# Patient Record
Sex: FEMALE | Race: WHITE | NOT HISPANIC OR LATINO | Employment: UNEMPLOYED | URBAN - NONMETROPOLITAN AREA
[De-identification: names, ages, dates, MRNs, and addresses within clinical notes are randomized per-mention and may not be internally consistent; named-entity substitution may affect disease eponyms.]

---

## 2017-01-17 ENCOUNTER — OFFICE VISIT (OUTPATIENT)
Dept: SURGERY | Facility: CLINIC | Age: 52
End: 2017-01-17

## 2017-01-17 VITALS
DIASTOLIC BLOOD PRESSURE: 90 MMHG | HEART RATE: 88 BPM | SYSTOLIC BLOOD PRESSURE: 118 MMHG | BODY MASS INDEX: 26.58 KG/M2 | WEIGHT: 150 LBS | HEIGHT: 63 IN

## 2017-01-17 DIAGNOSIS — K43.2 INCISIONAL HERNIA, WITHOUT OBSTRUCTION OR GANGRENE: Primary | ICD-10-CM

## 2017-01-17 PROCEDURE — 99212 OFFICE O/P EST SF 10 MIN: CPT | Performed by: SURGERY

## 2017-01-17 NOTE — MR AVS SNAPSHOT
Stephaniemisti Day   1/17/2017 3:10 PM   Office Visit    Dept Phone:  [phone]   Encounter #:  [id number]    Provider:  Matthew Cardoza MD   Department:  [facility] GENERAL SURGERY                Your Full Care Plan              Your Updated Medication List          This list is accurate as of: 1/17/17  2:40 PM.  Always use your most recent med list.                albuterol 108 (90 BASE) MCG/ACT inhaler   Commonly known as:  PROVENTIL HFA;VENTOLIN HFA       aspirin 81 MG EC tablet       B-12 COMPLIANCE INJECTION 1000 MCG/ML kit   Generic drug:  Cyanocobalamin       baclofen 10 MG tablet   Commonly known as:  LIORESAL       busPIRone 5 MG tablet   Commonly known as:  BUSPAR       * docusate sodium 250 MG capsule   Commonly known as:  COLACE   Take 1 capsule by mouth Daily.       * STOOL SOFTENER 250 MG capsule   Generic drug:  docusate sodium   Take 1 capsule by mouth Daily.       fluticasone 50 MCG/ACT nasal spray   Commonly known as:  FLONASE       gabapentin 300 MG capsule   Commonly known as:  NEURONTIN       ibuprofen 800 MG tablet   Commonly known as:  ADVIL,MOTRIN       ipratropium-albuterol 0.5-2.5 mg/mL nebulizer   Commonly known as:  DUO-NEB       losartan 25 MG tablet   Commonly known as:  COZAAR       metFORMIN 850 MG tablet   Commonly known as:  GLUCOPHAGE       metoprolol tartrate 25 MG tablet   Commonly known as:  LOPRESSOR       miconazole 2 % vaginal cream   Commonly known as:  MICOTIN       mometasone-formoterol 200-5 MCG/ACT inhaler   Commonly known as:  DULERA 200       montelukast 10 MG tablet   Commonly known as:  SINGULAIR       nystatin 183549 UNIT/GM ointment   Commonly known as:  MYCOSTATIN       omeprazole 40 MG capsule   Commonly known as:  priLOSEC       phenylephrine-shark liver oil-mineral oil-petrolatum 0.25-3-14-71.9 % rectal ointment   Commonly known as:  PREPARATION H       simvastatin 20 MG tablet   Commonly known as:  ZOCOR       "VICTOZA 18 MG/3ML solution pen-injector   Generic drug:  Liraglutide       * Notice:  This list has 2 medication(s) that are the same as other medications prescribed for you. Read the directions carefully, and ask your doctor or other care provider to review them with you.            Instructions     None    Patient Instructions History      Upcoming Appointments     Visit Type Date Time Department    FOLLOW UP 1/17/2017  3:10 PM [facility] [place]    FOLLOW UP 4/18/2017  2:20 PM [facility] [place]      MyChart Signup     Our records indicate that you have declined [facility] Proximetryhart signup. If you would like to sign up for Proximetryhart, please email [email or web address] or call [phone] to obtain an activation code.             Other Info from Your Visit           Your Appointments     Jan 17, 2017  3:10 PM EST   Follow Up with Matthew Cardoza MD   [facility] GENERAL SURGERY (--)    [address] [place] [id number] [phone]           Arrive 15 minutes prior to appointment.            Apr 18, 2017  2:20 PM EDT   Follow Up with Matthew Cardoza MD   [facility] GENERAL SURGERY (--)    [address] [place] [id number] [phone]           Arrive 15 minutes prior to appointment.              Allergies     Morphine And Related Allergy Hives, Shortness Of Breath    Latex      Nickel      Zithromax [Azithromycin] Allergy Palpitations      Reason for Visit     ABDOMINAL PAIN           Vital Signs     Blood Pressure Pulse Height Weight Body Mass Index Smoking Status    118/90 88 63\" (160 cm) 150 lb (68 kg) 26.57 kg/m2 Former Smoker        "

## 2017-01-17 NOTE — LETTER
January 18, 2017     Ashly Muir, APRN  [address] [place] [address]    Patient: Stephanie Day   YOB: 1965   Date of Visit: 1/17/2017       Dear Dr. Muir, APRN:    Thank you for referring Stephanie Day to me for evaluation. Below are the relevant portions of my assessment and plan of care.           51-year-old female with postoperative pain likely related to transfacial sutures causing some nerve disruption.  The patient is been assured that is the sutures absorbable and inflammation decreases her pain will likely improve.  She will follow-up in 1 month.               If you have questions, please do not hesitate to call me. I look forward to following Stephanie along with you.         Sincerely,        Matthew Cardoza MD        CC: No Recipients

## 2017-01-18 NOTE — PROGRESS NOTES
Subjective   Stephanie Day is a 51 y.o. female  is here today for follow-up.         Stephanie Day is a 51 y.o. female here for follow-up with concerns of burning pain along the rectus musculature bilaterally greater on the right.  She is undergone open hernia repair with separation of components and retrorectus fashion.  On examination there is no evidence of hernia recurrence or infection.  The patient has no tenderness to palpation but states with certain movements she gets burning pain that shoots down the lateral aspects of her abdominal wall.            Stephanie was seen today for abdominal pain.    Diagnoses and all orders for this visit:    Incisional hernia, without obstruction or gangrene        Assessment     51-year-old female with postoperative pain likely related to transfacial sutures causing some nerve disruption.  The patient is been assured that is the sutures absorbable and inflammation decreases her pain will likely improve.  She will follow-up in 1 month.

## 2017-06-13 ENCOUNTER — OFFICE VISIT (OUTPATIENT)
Dept: SURGERY | Facility: CLINIC | Age: 52
End: 2017-06-13

## 2017-06-13 VITALS — WEIGHT: 153.6 LBS | BODY MASS INDEX: 27.21 KG/M2 | HEIGHT: 63 IN

## 2017-06-13 DIAGNOSIS — K43.2 INCISIONAL HERNIA, WITHOUT OBSTRUCTION OR GANGRENE: Primary | ICD-10-CM

## 2017-06-13 PROCEDURE — 99213 OFFICE O/P EST LOW 20 MIN: CPT | Performed by: SURGERY

## 2017-06-15 NOTE — PROGRESS NOTES
Subjective   Stephanie Day is a 52 y.o. female  is here today for follow-up.         Stephanie Day is a 52 y.o. female here for follow up after open retrorectus incisional hernia repair with mesh.  The patient has continued muscle spasms which may be related to the transfacial sutures but no evidence of hernia recurrence is visible on examination today.  Her incision is clean dry and intact.  No evidence of inflammation or infection.  She has intermittent muscle spasms that are possibly related to deconditioning.      Stephanie was seen today for follow-up.    Diagnoses and all orders for this visit:    Incisional hernia, without obstruction or gangrene        Assessment     Stephanie Day is a 52 y.o. female status post open ventral incisional hernia repair with persistent muscle spasms.  This may be due to deconditioning as there is no evidence of hernia recurrence or mesh complication.  The patient will begin abdominal exercises and follow-up as needed.

## 2018-01-30 ENCOUNTER — OFFICE VISIT (OUTPATIENT)
Dept: SURGERY | Facility: CLINIC | Age: 53
End: 2018-01-30

## 2018-01-30 VITALS — HEIGHT: 63 IN | WEIGHT: 166 LBS | BODY MASS INDEX: 29.41 KG/M2

## 2018-01-30 DIAGNOSIS — K43.2 INCISIONAL HERNIA, WITHOUT OBSTRUCTION OR GANGRENE: Primary | ICD-10-CM

## 2018-01-30 PROCEDURE — 99213 OFFICE O/P EST LOW 20 MIN: CPT | Performed by: SURGERY

## 2018-02-01 NOTE — PROGRESS NOTES
Subjective   Stephanie Day is a 52 y.o. female  is here today for follow-up.         Stephanie Day is a 52 y.o. female here for follow up after incisional hernia repair with mesh.  The patient is continuing to do well but does have complaints of muscle spasm along the right breast as musculature.  No evidence of hernia recurrence infection or mesh complication.  She is tolerating regular diet and having normal bowel function.  Her midline incisional scars well-healed.    Stephanie was seen today for follow-up.    Diagnoses and all orders for this visit:    Incisional hernia, without obstruction or gangrene        Assessment     Stephanie Day is a 52 y.o. female status post open incisional hernia repair with mesh.  The patient is doing well without evidence of mesh complication, recurrence, or infection.  She will follow-up as needed.

## 2021-05-05 ENCOUNTER — TRANSCRIBE ORDERS (OUTPATIENT)
Dept: ADMINISTRATIVE | Facility: HOSPITAL | Age: 56
End: 2021-05-05

## 2021-05-05 DIAGNOSIS — Z01.818 OTHER SPECIFIED PRE-OPERATIVE EXAMINATION: Primary | ICD-10-CM

## 2021-05-10 ENCOUNTER — HOSPITAL ENCOUNTER (EMERGENCY)
Dept: HOSPITAL 79 - ER1 | Age: 56
Discharge: HOME | End: 2021-05-10
Payer: COMMERCIAL

## 2021-05-10 DIAGNOSIS — Z88.8: ICD-10-CM

## 2021-05-10 DIAGNOSIS — I10: ICD-10-CM

## 2021-05-10 DIAGNOSIS — R42: ICD-10-CM

## 2021-05-10 DIAGNOSIS — R07.89: Primary | ICD-10-CM

## 2021-05-10 DIAGNOSIS — Z20.822: ICD-10-CM

## 2021-05-10 DIAGNOSIS — E11.9: ICD-10-CM

## 2021-05-10 DIAGNOSIS — J44.9: ICD-10-CM

## 2021-05-10 LAB
BUN/CREATININE RATIO: 19 (ref 0–10)
HGB BLD-MCNC: 11.4 GM/DL (ref 12.3–15.3)
RED BLOOD COUNT: 3.9 M/UL (ref 4–5.1)
WHITE BLOOD COUNT: 8.6 K/UL (ref 4.5–11)

## 2021-05-19 ENCOUNTER — HOSPITAL ENCOUNTER (OUTPATIENT)
Dept: HOSPITAL 79 - EXRD | Age: 56
End: 2021-05-19
Attending: INTERNAL MEDICINE
Payer: COMMERCIAL

## 2021-05-19 DIAGNOSIS — R60.0: Primary | ICD-10-CM

## 2021-06-01 ENCOUNTER — HOSPITAL ENCOUNTER (OUTPATIENT)
Dept: HOSPITAL 79 - ECHO | Age: 56
End: 2021-06-01
Attending: FAMILY MEDICINE
Payer: COMMERCIAL

## 2021-06-01 DIAGNOSIS — I07.1: ICD-10-CM

## 2021-06-01 DIAGNOSIS — R60.9: Primary | ICD-10-CM

## 2021-11-30 ENCOUNTER — HOSPITAL ENCOUNTER (OUTPATIENT)
Dept: HOSPITAL 79 - MAMO | Age: 56
End: 2021-11-30
Payer: COMMERCIAL

## 2021-11-30 DIAGNOSIS — Z12.31: Primary | ICD-10-CM

## 2022-05-08 ENCOUNTER — HOSPITAL ENCOUNTER (EMERGENCY)
Dept: HOSPITAL 79 - ER1 | Age: 57
Discharge: HOME | End: 2022-05-08
Payer: COMMERCIAL

## 2022-05-08 DIAGNOSIS — J44.9: ICD-10-CM

## 2022-05-08 DIAGNOSIS — E78.5: ICD-10-CM

## 2022-05-08 DIAGNOSIS — E11.9: ICD-10-CM

## 2022-05-08 DIAGNOSIS — R10.84: ICD-10-CM

## 2022-05-08 DIAGNOSIS — I10: ICD-10-CM

## 2022-05-08 DIAGNOSIS — K21.9: ICD-10-CM

## 2022-05-08 DIAGNOSIS — R07.2: Primary | ICD-10-CM

## 2022-05-08 LAB
BUN/CREATININE RATIO: 18 (ref 0–10)
HGB BLD-MCNC: 12.1 GM/DL (ref 12.3–15.3)
RED BLOOD COUNT: 4.19 M/UL (ref 4–5.1)
WHITE BLOOD COUNT: 9 K/UL (ref 4.5–11)

## 2022-05-08 PROCEDURE — C9113 INJ PANTOPRAZOLE SODIUM, VIA: HCPCS

## 2022-05-27 DIAGNOSIS — M25.561 RIGHT KNEE PAIN, UNSPECIFIED CHRONICITY: Primary | ICD-10-CM

## 2022-05-31 ENCOUNTER — OFFICE VISIT (OUTPATIENT)
Dept: ORTHOPEDIC SURGERY | Facility: CLINIC | Age: 57
End: 2022-05-31

## 2022-05-31 ENCOUNTER — HOSPITAL ENCOUNTER (OUTPATIENT)
Dept: GENERAL RADIOLOGY | Facility: HOSPITAL | Age: 57
Discharge: HOME | End: 2022-05-31
Admitting: FAMILY MEDICINE

## 2022-05-31 VITALS
HEART RATE: 91 BPM | SYSTOLIC BLOOD PRESSURE: 124 MMHG | HEIGHT: 63 IN | OXYGEN SATURATION: 97 % | WEIGHT: 220 LBS | TEMPERATURE: 97.1 F | BODY MASS INDEX: 38.98 KG/M2 | DIASTOLIC BLOOD PRESSURE: 76 MMHG

## 2022-05-31 DIAGNOSIS — G89.29 CHRONIC HIP PAIN, RIGHT: ICD-10-CM

## 2022-05-31 DIAGNOSIS — M25.561 RIGHT KNEE PAIN, UNSPECIFIED CHRONICITY: ICD-10-CM

## 2022-05-31 DIAGNOSIS — G89.29 CHRONIC PAIN OF RIGHT KNEE: ICD-10-CM

## 2022-05-31 DIAGNOSIS — Z79.4 TYPE 2 DIABETES MELLITUS WITH DIABETIC NEUROPATHY, WITH LONG-TERM CURRENT USE OF INSULIN: Primary | ICD-10-CM

## 2022-05-31 DIAGNOSIS — M25.651 HIP STIFFNESS, RIGHT: ICD-10-CM

## 2022-05-31 DIAGNOSIS — M17.11 PRIMARY OSTEOARTHRITIS OF RIGHT KNEE: ICD-10-CM

## 2022-05-31 DIAGNOSIS — M25.551 CHRONIC HIP PAIN, RIGHT: ICD-10-CM

## 2022-05-31 DIAGNOSIS — E11.40 TYPE 2 DIABETES MELLITUS WITH DIABETIC NEUROPATHY, WITH LONG-TERM CURRENT USE OF INSULIN: Primary | ICD-10-CM

## 2022-05-31 DIAGNOSIS — M25.561 CHRONIC PAIN OF RIGHT KNEE: ICD-10-CM

## 2022-05-31 PROCEDURE — 99204 OFFICE O/P NEW MOD 45 MIN: CPT | Performed by: FAMILY MEDICINE

## 2022-05-31 PROCEDURE — 73562 X-RAY EXAM OF KNEE 3: CPT | Performed by: RADIOLOGY

## 2022-05-31 PROCEDURE — 73562 X-RAY EXAM OF KNEE 3: CPT

## 2022-05-31 RX ORDER — POTASSIUM CHLORIDE 600 MG/1
TABLET, FILM COATED, EXTENDED RELEASE ORAL
COMMUNITY
Start: 2022-03-08

## 2022-05-31 RX ORDER — LANOLIN ALCOHOL/MO/W.PET/CERES
65 CREAM (GRAM) TOPICAL
COMMUNITY

## 2022-05-31 RX ORDER — FENOFIBRATE 145 MG/1
TABLET, COATED ORAL
COMMUNITY
Start: 2022-03-29

## 2022-05-31 RX ORDER — FUROSEMIDE 20 MG/1
20 TABLET ORAL 2 TIMES DAILY
COMMUNITY

## 2022-05-31 RX ORDER — FERROUS SULFATE 325(65) MG
TABLET ORAL
COMMUNITY
Start: 2022-05-05

## 2022-06-08 ENCOUNTER — OFFICE VISIT (OUTPATIENT)
Dept: ORTHOPEDIC SURGERY | Facility: CLINIC | Age: 57
End: 2022-06-08

## 2022-06-08 ENCOUNTER — HOSPITAL ENCOUNTER (OUTPATIENT)
Dept: GENERAL RADIOLOGY | Facility: HOSPITAL | Age: 57
Discharge: HOME | End: 2022-06-08
Admitting: FAMILY MEDICINE

## 2022-06-08 VITALS
SYSTOLIC BLOOD PRESSURE: 111 MMHG | DIASTOLIC BLOOD PRESSURE: 70 MMHG | WEIGHT: 184 LBS | HEIGHT: 63 IN | HEART RATE: 92 BPM | TEMPERATURE: 97.5 F | BODY MASS INDEX: 32.6 KG/M2

## 2022-06-08 DIAGNOSIS — M25.551 CHRONIC HIP PAIN, RIGHT: ICD-10-CM

## 2022-06-08 DIAGNOSIS — M17.11 PRIMARY OSTEOARTHRITIS OF RIGHT KNEE: Primary | ICD-10-CM

## 2022-06-08 DIAGNOSIS — M25.551 RIGHT HIP PAIN: Primary | ICD-10-CM

## 2022-06-08 DIAGNOSIS — G89.29 CHRONIC PAIN OF RIGHT KNEE: ICD-10-CM

## 2022-06-08 DIAGNOSIS — M25.651 HIP STIFFNESS, RIGHT: ICD-10-CM

## 2022-06-08 DIAGNOSIS — M25.561 CHRONIC PAIN OF RIGHT KNEE: ICD-10-CM

## 2022-06-08 DIAGNOSIS — M16.0 PRIMARY OSTEOARTHRITIS OF BOTH HIPS: ICD-10-CM

## 2022-06-08 DIAGNOSIS — G89.29 CHRONIC HIP PAIN, RIGHT: ICD-10-CM

## 2022-06-08 PROCEDURE — 20610 DRAIN/INJ JOINT/BURSA W/O US: CPT | Performed by: FAMILY MEDICINE

## 2022-06-08 PROCEDURE — 99214 OFFICE O/P EST MOD 30 MIN: CPT | Performed by: FAMILY MEDICINE

## 2022-06-08 PROCEDURE — 73502 X-RAY EXAM HIP UNI 2-3 VIEWS: CPT

## 2022-06-08 PROCEDURE — 73502 X-RAY EXAM HIP UNI 2-3 VIEWS: CPT | Performed by: RADIOLOGY

## 2022-06-08 RX ORDER — DICLOFENAC SODIUM 30 MG/G
GEL TOPICAL 2 TIMES DAILY
COMMUNITY

## 2022-06-08 RX ORDER — NABUMETONE 500 MG/1
500 TABLET, FILM COATED ORAL EVERY 12 HOURS PRN
Qty: 60 TABLET | Refills: 1 | Status: SHIPPED | OUTPATIENT
Start: 2022-06-08

## 2022-06-08 RX ADMIN — LIDOCAINE HYDROCHLORIDE 5 ML: 20 INJECTION, SOLUTION INFILTRATION; PERINEURAL at 14:26

## 2022-06-08 NOTE — PROGRESS NOTES
"Follow Up Visit      Patient: Stephanie Day  YOB: 1965  Date of Encounter: 06/08/2022  PCP: Ashly Muir APRN  Referring Provider: No ref. provider found     Subjective   Stephanie Day is a 57 y.o. female who presents to the office today for evaluation of Follow-up of the Right Knee and Pain of the Right Hip (Stiffness, New Problem)      Chief Complaint   Patient presents with   • Right Knee - Follow-up   • Right Hip - Pain     Stiffness, New Problem       HPI  Patient presents for follow-up for right knee injection with Zilretta.  Patient also is getting further evaluation for her right hip today.  Continued complaints of continued pain and stiffness in the right hip with difficulty ambulating.  Seems to be getting worse.  Patient Active Problem List   Diagnosis   • Incisional hernia, without obstruction or gangrene   • Coronary artery disease   • COPD (chronic obstructive pulmonary disease) ([facility])   • Diabetes ([facility])       Past Medical History:   Diagnosis Date   • Anemia    • Anxiety    • Arthritis    • COPD (chronic obstructive pulmonary disease) ([facility])    • Coronary artery disease    • Depressed    • Depression    • Diabetes ([facility])    • Hypertension    • MI (myocardial infarction) ([facility]) 2014   • Panic attack        Allergies   Allergen Reactions   • Morphine And Related Hives and Shortness Of Breath   • Latex Hives   • Nickel Other (See Comments)     Breaks out   • Zithromax [Azithromycin] Palpitations       Review of Systems   Constitutional: Positive for activity change. Negative for fever.   Respiratory: Negative for shortness of breath and wheezing.    Cardiovascular: Negative for chest pain.   Musculoskeletal: Positive for arthralgias and myalgias.   Skin: Negative for color change and wound.   Neurological: Negative for weakness and numbness.       Visit Vitals  /70   Pulse 92   Temp 97.5 °F (36.4 °C)   Ht 160 cm (63\")   Wt 83.5 kg (184 lb)   BMI 32.59 kg/m²     57 " y.o.female  Physical Exam  Vitals and nursing note reviewed.   Constitutional:       General: She is not in acute distress.     Appearance: Normal appearance.   Pulmonary:      Effort: Pulmonary effort is normal. No respiratory distress.   Skin:     General: Skin is warm and dry.      Findings: No erythema.   Neurological:      General: No focal deficit present.      Mental Status: She is alert.      Sensory: No sensory deficit.      Motor: No weakness.     Right knee exam:  Sensation is intact.   Difficulty fully extending the right knee due to hip pain.   No effusion.   Tender medial and lateral joint line.   Patient can fully extend the knee if the entire leg is raised, but hip cannot fully straighten out.   Negative varus, valgus stress.   Negative AP drawer.      Right hip exam:  Very limited range of motion with pain in all directions. Cannot extend the hip enough to lay it flat on the table. Intact strength.  Tender greater trochanter and anterior joint     Left knee exam:  Full range of motion.   Negative varus, valgus stress.   Negative AP drawer.   Negative Lachman.      Right lower extremity exam:  Neurovascularly intact.   Painful at the ankle.     Left lower extremity exam:  Neurovascularly intact.     Radiology Results:    XR Knee 3 View Right    Result Date: 5/31/2022  Mild osteoarthritis in the right knee.  This report was finalized on 5/31/2022 3:18 PM by Dr. Raj Carlton II, MD.      XR Hip With or Without Pelvis 2 - 3 View Right    Result Date: 6/8/2022    Very advanced right and advanced left hip osteoarthritic change.  This report was finalized on 6/8/2022 1:40 PM by Dr. Mars Wilson MD.    Large Joint Arthrocentesis: R knee  Date/Time: 6/8/2022 2:26 PM  Consent given by: patient  Site marked: site marked  Timeout: Immediately prior to procedure a time out was called to verify the correct patient, procedure, equipment, support staff and site/side marked as required   Supporting  Documentation  Indications: pain and joint swelling   Procedure Details  Location: knee - R knee  Needle size: 20 G  Approach: anterolateral  Medications administered: 32 mg Triamcinolone Acetonide 32 MG; 5 mL lidocaine 2%  Patient tolerance: patient tolerated the procedure well with no immediate complications       Injection site on the lateral knee was cleaned with alcohol and iodine and anesthesia was provided with ethyl chloride spray.  Then 5 cc of 2% lidocaine without epinephrine was injected into the subcutaneous tissues using a 1.5 inch 25-gauge needle for anesthesia.  Then using sterile technique and a 20-gauge 1.5 inch needle the knee joint was accessed 32mg of zilretta was injected.  The injection site was covered with sterile bandage.  There is no adverse effects and negligible  blood loss.  Follow-up care and precautions were discussed.      Assessment & Plan   Diagnoses and all orders for this visit:    1. Primary osteoarthritis of right knee (Primary)  -     nabumetone (RELAFEN) 500 MG tablet; Take 1 tablet by mouth Every 12 (Twelve) Hours As Needed for Moderate Pain .  Dispense: 60 tablet; Refill: 1  -     Large Joint Arthrocentesis: R knee  -     FL Guide For Pain Meds Inj; Future    2. Chronic pain of right knee  -     nabumetone (RELAFEN) 500 MG tablet; Take 1 tablet by mouth Every 12 (Twelve) Hours As Needed for Moderate Pain .  Dispense: 60 tablet; Refill: 1  -     Large Joint Arthrocentesis: R knee  -     FL Guide For Pain Meds Inj; Future    3. Chronic hip pain, right  -     nabumetone (RELAFEN) 500 MG tablet; Take 1 tablet by mouth Every 12 (Twelve) Hours As Needed for Moderate Pain .  Dispense: 60 tablet; Refill: 1  -     FL Guide For Pain Meds Inj; Future    4. Hip stiffness, right  -     nabumetone (RELAFEN) 500 MG tablet; Take 1 tablet by mouth Every 12 (Twelve) Hours As Needed for Moderate Pain .  Dispense: 60 tablet; Refill: 1  -     FL Guide For Pain Meds Inj; Future    5. Primary  osteoarthritis of both hips  -     nabumetone (RELAFEN) 500 MG tablet; Take 1 tablet by mouth Every 12 (Twelve) Hours As Needed for Moderate Pain .  Dispense: 60 tablet; Refill: 1  -     FL Guide For Pain Meds Inj; Future         MEDS ORDERED DURING VISIT:  New Medications Ordered This Visit   Medications   • nabumetone (RELAFEN) 500 MG tablet     Sig: Take 1 tablet by mouth Every 12 (Twelve) Hours As Needed for Moderate Pain .     Dispense:  60 tablet     Refill:  1     MEDICATION ISSUES:  Discussed medication options and treatment plan of prescribed medication as well as the risks, benefits, and side effects including potential falls, possible impaired driving and metabolic adversities among others. Patient is agreeable to call the office with any worsening of symptoms or onset of side effects. Patient is agreeable to call 911 or go to the nearest ER should he/she begin having SI/HI.     Discussion:  Patient had improved pain after Zilretta injection.  We will try nabumetone to see if that works better than the other NSAIDs she has tried in the past.  Patient was also being referred for steroid injection in the right hip under fluoroscopy guidance.  Follow-up after that injection             This document has been electronically signed by David Mina DO   June 8, 2022 14:28 EDT    Part of this note may be an electronic transcription/translation of spoken language to printed text using the Dragon Dictation System.

## 2022-06-24 ENCOUNTER — TELEPHONE (OUTPATIENT)
Dept: ORTHOPEDIC SURGERY | Facility: CLINIC | Age: 57
End: 2022-06-24

## 2022-06-24 NOTE — TELEPHONE ENCOUNTER
Patient called asking about her inj in her hip, saying that she never got a call. I called scheduling to get it set up, she said she will call me back with an appt. Will notify patient afterwards.

## 2022-06-28 RX ORDER — LIDOCAINE HYDROCHLORIDE 20 MG/ML
5 INJECTION, SOLUTION INFILTRATION; PERINEURAL
Status: COMPLETED | OUTPATIENT
Start: 2022-06-08 | End: 2022-06-08

## 2022-07-06 ENCOUNTER — OFFICE VISIT (OUTPATIENT)
Dept: ORTHOPEDIC SURGERY | Facility: CLINIC | Age: 57
End: 2022-07-06

## 2022-07-06 ENCOUNTER — TELEPHONE (OUTPATIENT)
Dept: ORTHOPEDIC SURGERY | Facility: CLINIC | Age: 57
End: 2022-07-06

## 2022-07-06 VITALS
HEIGHT: 63 IN | SYSTOLIC BLOOD PRESSURE: 120 MMHG | HEART RATE: 92 BPM | BODY MASS INDEX: 32.6 KG/M2 | DIASTOLIC BLOOD PRESSURE: 71 MMHG | WEIGHT: 184 LBS

## 2022-07-06 DIAGNOSIS — M25.551 CHRONIC HIP PAIN, RIGHT: ICD-10-CM

## 2022-07-06 DIAGNOSIS — M17.11 PRIMARY OSTEOARTHRITIS OF RIGHT KNEE: Primary | ICD-10-CM

## 2022-07-06 DIAGNOSIS — G89.29 CHRONIC HIP PAIN, RIGHT: ICD-10-CM

## 2022-07-06 DIAGNOSIS — G89.29 CHRONIC PAIN OF RIGHT KNEE: ICD-10-CM

## 2022-07-06 DIAGNOSIS — M16.0 PRIMARY OSTEOARTHRITIS OF BOTH HIPS: ICD-10-CM

## 2022-07-06 DIAGNOSIS — M25.651 HIP STIFFNESS, RIGHT: ICD-10-CM

## 2022-07-06 DIAGNOSIS — M25.561 CHRONIC PAIN OF RIGHT KNEE: ICD-10-CM

## 2022-07-06 PROCEDURE — 99213 OFFICE O/P EST LOW 20 MIN: CPT | Performed by: FAMILY MEDICINE

## 2022-07-06 RX ORDER — DOXYCYCLINE HYCLATE 150 MG/1
TABLET ORAL
COMMUNITY
Start: 2022-06-29

## 2022-07-06 NOTE — TELEPHONE ENCOUNTER
Called patient cell phone to tell her that she can  her Rx for Nabumetone at her pharmacy. It rang then said call couldn't be completed and then disconnected. Tried cell again, same. KD on home phone answering machine.

## 2022-07-06 NOTE — PROGRESS NOTES
Follow Up Visit      Patient: Stephanie Day  YOB: 1965  Date of Encounter: 07/06/2022  PCP: Ashyl Muir APRN  Referring Provider: No ref. provider found     Subjective   Stephanie Day is a 57 y.o. female who presents to the office today for evaluation of Follow-up and Pain of the Right Knee and Follow-up and Pain of the Right Hip (Burning)      Chief Complaint   Patient presents with   • Right Knee - Follow-up, Pain   • Right Hip - Follow-up, Pain     Burning       HPI     The patient recently received a Zilretta injection in the right knee as well as a referral for fluoroscopy-guided hip injection. She was also started on nabumetone.     Stephanie states she has not received her hip injection, but has been told it is scheduled for 07/20/2022. The patient reports she has been unable to squat for many years and standing on her right leg sometimes aggravates the burning sensation in her hip. She would like to move forward with right hip injection.    The patient reports she continues to experience pain in her right knee, with a 50-percent improvement compared to pre-injection pain. She adds the burning, swelling and pain has decreased. The patient notes recent muscle spasms behind her patella, but says this has resolved although states she currently has mild tingling in the area. She describes a knot behind her knee prior to the injection which has since resolved.     Patient Active Problem List   Diagnosis   • Incisional hernia, without obstruction or gangrene   • Coronary artery disease   • COPD (chronic obstructive pulmonary disease) ([facility])   • Diabetes ([facility])       Past Medical History:   Diagnosis Date   • Anemia    • Anxiety    • Arthritis    • COPD (chronic obstructive pulmonary disease) ([facility])    • Coronary artery disease    • Depressed    • Depression    • Diabetes ([facility])    • Hypertension    • MI (myocardial infarction) ([facility]) 2014   • Panic attack        Allergies   Allergen  "Reactions   • Morphine And Related Hives and Shortness Of Breath   • Latex Hives   • Nickel Other (See Comments)     Breaks out   • Zithromax [Azithromycin] Palpitations       Review of Systems   Constitutional: Positive for activity change. Negative for fever.   Respiratory: Negative for shortness of breath and wheezing.    Cardiovascular: Negative for chest pain.   Musculoskeletal: Positive for arthralgias and myalgias.   Skin: Negative for color change and wound.   Neurological: Negative for weakness and numbness.       Visit Vitals  /71   Pulse 92   Ht 160 cm (63\")   Wt 83.5 kg (184 lb)   BMI 32.59 kg/m²     57 y.o.female  Physical Exam  Vitals and nursing note reviewed.   Constitutional:       General: She is not in acute distress.     Appearance: Normal appearance.   Pulmonary:      Effort: Pulmonary effort is normal. No respiratory distress.   Musculoskeletal:      Comments:   Right knee exam:  Tenderness on the lateral joint line.  Mild tenderness on the medial joint line.  Full knee extension with hyperextension.  Nearly full flexion.   Skin:     General: Skin is warm and dry.      Findings: No erythema.   Neurological:      General: No focal deficit present.      Mental Status: She is alert.      Sensory: No sensory deficit.      Motor: No weakness.         Radiology Results:    XR Hip With or Without Pelvis 2 - 3 View Right    Result Date: 6/8/2022    Very advanced right and advanced left hip osteoarthritic change.  This report was finalized on 6/8/2022 1:40 PM by Dr. Mars Wilson MD.        Assessment & Plan   Diagnoses and all orders for this visit:    1. Primary osteoarthritis of right knee (Primary)    2. Chronic pain of right knee    3. Chronic hip pain, right    4. Hip stiffness, right    5. Primary osteoarthritis of both hips         MEDS ORDERED DURING VISIT:  No orders of the defined types were placed in this encounter.    MEDICATION ISSUES:  Discussed medication options and treatment plan " of prescribed medication as well as the risks, benefits, and side effects including potential falls, possible impaired driving and metabolic adversities among others. Patient is agreeable to call the office with any worsening of symptoms or onset of side effects. Patient is agreeable to call 911 or go to the nearest ER should he/she begin having SI/HI.     Discussion:  Stephanie is scheduled for right hip injection at the hospital on 07/20/2022. The patient would like to move forward with viscosupplementation injection in the right knee, as she has experienced only approximately 50-percent relief with the recent steroid injection. When this has been approved she will follow up to receive the injection. Following injection she will plan to begin physical therapy in an attempt to recover some of her hip range of motion, which is causing significant difficulties for her knees, and gait.    Patient had been unable to get nabumetone at the pharmacy so far due to needing a prior/auth and is still taking her old nsaid.            This document has been electronically signed by David Mina DO   July 6, 2022 14:13 EDT    Part of this note may be an electronic transcription/translation of spoken language to printed text using the Dragon Dictation System.    Transcribed from ambient dictation for David Mina DO by ALEXEY BROWN.  07/06/22   15:43 EDT    Patient verbalized consent to the visit recording.  I have personally performed the services described in this document as transcribed by the above individual, and it is both accurate and complete.  David Mina DO  7/11/2022  16:13 EDT

## 2022-07-07 ENCOUNTER — TELEPHONE (OUTPATIENT)
Dept: ORTHOPEDIC SURGERY | Facility: CLINIC | Age: 57
End: 2022-07-07

## 2022-07-07 NOTE — TELEPHONE ENCOUNTER
Called patient to tell her that she has been approved to get the inj and we made an appt while on the phone. Verbalized understanding.

## 2022-07-11 ENCOUNTER — OFFICE VISIT (OUTPATIENT)
Dept: ORTHOPEDIC SURGERY | Facility: CLINIC | Age: 57
End: 2022-07-11

## 2022-07-11 VITALS
OXYGEN SATURATION: 94 % | TEMPERATURE: 97.5 F | DIASTOLIC BLOOD PRESSURE: 68 MMHG | WEIGHT: 184 LBS | HEIGHT: 63 IN | SYSTOLIC BLOOD PRESSURE: 122 MMHG | HEART RATE: 85 BPM | BODY MASS INDEX: 32.6 KG/M2

## 2022-07-11 DIAGNOSIS — M25.561 CHRONIC PAIN OF RIGHT KNEE: ICD-10-CM

## 2022-07-11 DIAGNOSIS — M17.11 PRIMARY OSTEOARTHRITIS OF RIGHT KNEE: Primary | ICD-10-CM

## 2022-07-11 DIAGNOSIS — G89.29 CHRONIC PAIN OF RIGHT KNEE: ICD-10-CM

## 2022-07-11 PROCEDURE — 20610 DRAIN/INJ JOINT/BURSA W/O US: CPT | Performed by: FAMILY MEDICINE

## 2022-07-11 NOTE — PROGRESS NOTES
"Follow Up Visit      Patient: Stephanie Day  YOB: 1965  Date of Encounter: 07/11/2022  PCP: Ashly Muir APRN  Referring Provider: No ref. provider found     Subjective   Stephanie Day is a 57 y.o. female who presents to the office today for procedure    Chief Complaint   Patient presents with   • Right Knee - Pain, Edema       HPI     Stephanie reports her knee pain has worsened over the last 1 week or so. She states the steroid injection has lost its effectiveness already. The patient received Zilretta approximately 1 month ago, which should have lasted 3 months.    Allergies   Allergen Reactions   • Morphine And Related Hives and Shortness Of Breath   • Latex Hives   • Nickel Other (See Comments)     Breaks out   • Zithromax [Azithromycin] Palpitations       Visit Vitals  /68   Pulse 85   Temp 97.5 °F (36.4 °C)   Ht 160 cm (63\")   Wt 83.5 kg (184 lb)   SpO2 94%   BMI 32.59 kg/m²     57 y.o.female    Large Joint Arthrocentesis: R knee  Date/Time: 7/11/2022 3:19 PM  Consent given by: patient  Site marked: site marked  Timeout: Immediately prior to procedure a time out was called to verify the correct patient, procedure, equipment, support staff and site/side marked as required   Supporting Documentation  Indications: pain and joint swelling   Procedure Details  Location: knee - R knee  Needle size: 20 G  Approach: anterolateral  Medications administered: 60 mg Sodium Hyaluronate 60 MG/3ML; 5 mL lidocaine (cardiac)  Patient tolerance: patient tolerated the procedure well with no immediate complications        Injection site on the lateral knee was cleaned with alcohol and iodine and anesthesia was provided with ethyl chloride spray.  Then 2 cc of 1% lidocaine without epinephrine was injected into the subcutaneous tissues using a 1.5 inch 25-gauge needle for anesthesia.  Then using sterile technique and a 20-gauge 1.5 inch needle the knee joint was accessed and further 3 cc " of 1% lidocaine was injected to confirm access to the joint.  Using sterile technique the syringe was switched and 60 mg of durolane was injected.  The injection site was covered with sterile bandage.  There is no adverse effects and negligible  blood loss.  Follow-up care and precautions were discussed.    Assessment & Plan   Diagnoses and all orders for this visit:    1. Primary osteoarthritis of right knee (Primary)  -     Large Joint Arthrocentesis: R knee    2. Chronic pain of right knee  -     Large Joint Arthrocentesis: R knee         MEDS ORDERED DURING VISIT:  No orders of the defined types were placed in this encounter.    MEDICATION ISSUES:  Discussed medication options and treatment plan of prescribed medication as well as the risks, benefits, and side effects including potential falls, possible impaired driving and metabolic adversities among others. Patient is agreeable to call the office with any worsening of symptoms or onset of side effects. Patient is agreeable to call 911 or go to the nearest ER should he/she begin having SI/HI.     Discussion:  Stephanie received a Durolane injection in the right knee today. She will follow up in 3 weeks for reevaluation. If she is still not improving, she will need an MRI. If she has improved, she will begin physical therapy in an effort to prevent symptoms from recurring.          This document has been electronically signed by David Mina DO   July 11, 2022 15:06 EDT    Part of this note may be an electronic transcription/translation of spoken language to printed text using the Dragon Dictation System.    Transcribed from ambient dictation for David Mina DO by ALEXEY BROWN.  07/11/22   17:00 EDT    Patient verbalized consent to the visit recording.  I have personally performed the services described in this document as transcribed by the above individual, and it is both accurate and complete.  David Mina DO  7/24/2022  22:56 EDT

## 2022-08-04 ENCOUNTER — TELEPHONE (OUTPATIENT)
Dept: ORTHOPEDIC SURGERY | Facility: CLINIC | Age: 57
End: 2022-08-04

## 2022-08-04 NOTE — TELEPHONE ENCOUNTER
Spoke with patient, concerning right knee Fl guide for pain,  she stated unable to have this injection done at this time will call and make an appt with Dr. Mina to discuss.

## 2022-09-13 ENCOUNTER — HOSPITAL ENCOUNTER (EMERGENCY)
Dept: HOSPITAL 79 - ER1 | Age: 57
Discharge: HOME | End: 2022-09-13
Payer: COMMERCIAL

## 2022-09-13 DIAGNOSIS — R06.02: Primary | ICD-10-CM

## 2022-09-13 DIAGNOSIS — E78.5: ICD-10-CM

## 2022-09-13 DIAGNOSIS — Z20.822: ICD-10-CM

## 2022-09-13 DIAGNOSIS — J44.9: ICD-10-CM

## 2022-09-13 DIAGNOSIS — I11.9: ICD-10-CM

## 2022-09-13 DIAGNOSIS — E11.9: ICD-10-CM

## 2022-09-13 DIAGNOSIS — Z79.4: ICD-10-CM

## 2022-09-13 DIAGNOSIS — I25.2: ICD-10-CM

## 2022-09-13 LAB
BUN/CREATININE RATIO: 14 (ref 0–10)
HGB BLD-MCNC: 11.2 GM/DL (ref 12.3–15.3)
RED BLOOD COUNT: 3.78 M/UL (ref 4–5.1)
WHITE BLOOD COUNT: 8.9 K/UL (ref 4.5–11)

## 2022-09-13 PROCEDURE — U0002 COVID-19 LAB TEST NON-CDC: HCPCS

## 2024-11-01 RX ORDER — POLYETHYLENE GLYCOL 3350 17 G/17G
17 POWDER, FOR SOLUTION ORAL
COMMUNITY
Start: 2024-04-09

## 2024-11-01 RX ORDER — SEMAGLUTIDE 1.34 MG/ML
1 INJECTION, SOLUTION SUBCUTANEOUS
COMMUNITY

## 2024-11-01 RX ORDER — TERBINAFINE HYDROCHLORIDE 250 MG/1
250 TABLET ORAL
COMMUNITY
Start: 2024-08-27

## 2024-11-01 RX ORDER — LEVOCETIRIZINE DIHYDROCHLORIDE 5 MG/1
5 TABLET, FILM COATED ORAL
COMMUNITY
Start: 2024-04-09

## 2024-11-01 RX ORDER — TIOTROPIUM BROMIDE 18 UG/1
1 CAPSULE ORAL; RESPIRATORY (INHALATION)
COMMUNITY

## 2024-11-01 RX ORDER — BUDESONIDE AND FORMOTEROL FUMARATE DIHYDRATE 160; 4.5 UG/1; UG/1
2 AEROSOL RESPIRATORY (INHALATION) 2 TIMES DAILY
COMMUNITY
Start: 2024-07-16

## 2024-11-01 RX ORDER — ERGOCALCIFEROL 1.25 MG/1
50000 CAPSULE, LIQUID FILLED ORAL DAILY
COMMUNITY
Start: 2024-05-28

## 2024-11-01 RX ORDER — ACETAMINOPHEN 500 MG
500 TABLET ORAL EVERY 6 HOURS PRN
COMMUNITY
Start: 2024-08-30

## 2024-11-01 RX ORDER — CLOTRIMAZOLE AND BETAMETHASONE DIPROPIONATE 10; .64 MG/G; MG/G
1 CREAM TOPICAL 2 TIMES DAILY
COMMUNITY
Start: 2024-04-09

## 2024-11-01 RX ORDER — SODIUM CHLORIDE 1 G/1
1 TABLET ORAL
COMMUNITY
Start: 2024-09-19

## 2024-11-01 RX ORDER — FAMOTIDINE 40 MG/1
40 TABLET, FILM COATED ORAL DAILY
COMMUNITY
Start: 2024-07-29

## 2024-11-01 RX ORDER — ROFLUMILAST 500 UG/1
500 TABLET ORAL DAILY
COMMUNITY
Start: 2024-09-18

## 2024-11-01 RX ORDER — SIMETHICONE 125 MG
125 TABLET,CHEWABLE ORAL EVERY 6 HOURS PRN
COMMUNITY
Start: 2024-07-01

## 2024-11-01 RX ORDER — BLOOD SUGAR DIAGNOSTIC
1 STRIP MISCELLANEOUS
COMMUNITY
Start: 2024-07-01